# Patient Record
Sex: FEMALE | Race: WHITE
[De-identification: names, ages, dates, MRNs, and addresses within clinical notes are randomized per-mention and may not be internally consistent; named-entity substitution may affect disease eponyms.]

---

## 2017-03-28 ENCOUNTER — HOSPITAL ENCOUNTER (OUTPATIENT)
Dept: HOSPITAL 47 - RADBDWWP | Age: 72
Discharge: HOME | End: 2017-03-28
Payer: MEDICARE

## 2017-03-28 DIAGNOSIS — M85.851: ICD-10-CM

## 2017-03-28 DIAGNOSIS — M85.852: Primary | ICD-10-CM

## 2017-03-28 DIAGNOSIS — Z78.0: ICD-10-CM

## 2017-03-28 PROCEDURE — 77080 DXA BONE DENSITY AXIAL: CPT

## 2017-03-28 NOTE — BD
EXAMINATION TYPE: MG DEXA axial skeleton.  

 

DATE OF EXAM: 3/28/2017 9:50 AM

 

CLINICAL HISTORY: 

 

Height:  60.25 inches

Weight:  140

 

FRAX RISK QUESTIONS:

Alcohol (3 or more units per day):  no

Family History (Parent hip fracture):  no

Glucocorticoids (More than 3mos):  no

           (Ex: prednisone, prednisolone, methylprednisolone, dexamethasone, and hydrocortisone).    
     

History of Fracture in Adulthood: rib, a couple years ago

Secondary Osteoporosis: 

  1.  Type 1 Diabetes: no

  2.  Hyperthyroidism: no

  3.  Menopause before 45: no

  4.  Malnutrition: no

  5.  Chronic liver disease: no

Rheumatoid Arthritis: no

Current Tobacco Use: no

 

RISK FACTORS 

HISTORY OF: 

Family History of Osteoporosis: no

Drink Alcohol: no

Active: yes

Diet low in dairy products/other sources of calcium:  at least one serving a day

Postmenopausal woman: yes

Take estrogen and/or progesterone medications: not now

How long: probably over 5 years

Lost more than 2 inches in height since high school: no

Frequent falls: no

Poor Health: no

Hyperparathyroidism: no

Adrenal Insufficiency: no

 

MEDICATIONS: 

Prednisone or other steroids: no

Thyroid Medications: no 

Osteoporosis Medications: no

Additional Medications: blood pressure meds, multi vitamin, cholesterol meds 

 

EXAM MEASUREMENTS: 

Bone mineral densitometry was performed using the [employer] System.

Bone mineral density as measured about the Lumbar spine is:  

----- L1-L4(G/cm2): 1.246

T Score Values are as follows:

----- L2: -0.6

----- L3: 1.3

----- L4: 1.5

----- L1-L4: 0.6

Bone mineral density not previously done at this facility; done elsewhere years ago

 

Bone mineral density about the R hip (g/cm2): 0.819

Bone mineral density about the L hip (g/cm2): 0.787

T Score values are as follows:

-----R Neck: -1.6

-----L Neck: -1.8

-----R Intertrochanter: -1.2

-----L Intertrochanter: -1.6

Bone mineral density not previously done at this facility; done elsewhere years ago

 

IMPRESSION:

Normal (Values between +1 and -1 indicate normal bone mass)  Lumbar Spine

 

Osteopenia (T Score between -2.5 and -1 as noted by T score values   Bilateral Hips

There is slightly increased risk of fracture and the patient may be considered 

for treatment. Re-Screen 1-2 years.

 

NOTE:  T-SCORE=SD OF THE YOUNG ADULT MEAN.

## 2018-01-30 ENCOUNTER — HOSPITAL ENCOUNTER (OUTPATIENT)
Dept: HOSPITAL 47 - RADMAMWWP | Age: 73
Discharge: HOME | End: 2018-01-30
Payer: MEDICARE

## 2018-01-30 DIAGNOSIS — Z12.31: Primary | ICD-10-CM

## 2018-01-30 PROCEDURE — 77063 BREAST TOMOSYNTHESIS BI: CPT

## 2018-01-30 PROCEDURE — 77067 SCR MAMMO BI INCL CAD: CPT

## 2018-02-01 NOTE — MM
Reason for exam: screening  (asymptomatic).

Last mammogram was performed 1 year and 1 month ago.



History:

Patient is postmenopausal and has history of other cancer at age 54.

Took estrogen for 20 years.  Took progesterone for 20 years.



Physical Findings:

A clinical breast exam by your physician is recommended on an annual basis and 

results should be correlated with mammographic findings.



MG 3D Screening Mammo W/Cad

Bilateral CC and MLO view(s) were taken.

Prior study comparison: December 29, 2016, mammogram.  December 1, 2015, 

mammogram.

The breast tissue is heterogeneously dense. This may lower the sensitivity of 

mammography.  No significant changes when compared with prior studies.





ASSESSMENT: Negative, BI-RAD 1



RECOMMENDATION:

Routine screening mammogram of both breasts in 1 year.

## 2019-03-29 ENCOUNTER — HOSPITAL ENCOUNTER (OUTPATIENT)
Dept: HOSPITAL 47 - RADMAMWWP | Age: 74
Discharge: HOME | End: 2019-03-29
Attending: FAMILY MEDICINE
Payer: MEDICARE

## 2019-03-29 DIAGNOSIS — Z12.31: Primary | ICD-10-CM

## 2019-03-29 DIAGNOSIS — Z78.0: ICD-10-CM

## 2019-03-29 DIAGNOSIS — M85.851: ICD-10-CM

## 2019-03-29 DIAGNOSIS — M41.86: ICD-10-CM

## 2019-03-29 DIAGNOSIS — M85.852: ICD-10-CM

## 2019-03-29 PROCEDURE — 77063 BREAST TOMOSYNTHESIS BI: CPT

## 2019-03-29 PROCEDURE — 77080 DXA BONE DENSITY AXIAL: CPT

## 2019-03-29 PROCEDURE — 77067 SCR MAMMO BI INCL CAD: CPT

## 2019-04-01 NOTE — MM
Reason for exam: screening  (asymptomatic).

Last mammogram was performed 1 year and 2 months ago.



History:

Patient is postmenopausal and has history of other cancer at age 54.

Took estrogen for 20 years.  Took progesterone for 20 years.



Physical Findings:

A clinical breast exam by your physician is recommended on an annual basis and 

results should be correlated with mammographic findings.



MG 3D Screening Mammo W/Cad

Bilateral CC and MLO view(s) were taken.

Prior study comparison: January 30, 2018, bilateral MG 3d screening mammo w/cad.  

December 29, 2016, mammogram.

The breast tissue is heterogeneously dense. This may lower the sensitivity of 

mammography.  There are benign appearing vascular calcifications bilaterally. 

There is no discrete abnormality.





ASSESSMENT: Negative, BI-RAD 1



RECOMMENDATION:

Routine screening mammogram of both breasts in 1 year.

## 2019-04-02 NOTE — BD
EXAMINATION TYPE: Axial Bone Density

 

DATE OF EXAM: 3/29/2019

 

COMPARISON: 03.28.2017

 

CLINICAL HISTORY: 73 YR OLD FEMALE....ICD-10 CODE:   M89.9   DISORDER OF BONE

 

Height:  59.8

Weight:  139

 

FRAX RISK QUESTIONS:

 

History of Fracture in Adulthood: YES

 

 

RISK FACTORS 

HISTORY OF: 

HX OF RIB FRACTURES >50 YRS OLD

Postmenopausal woman: YES, AT 51 YRS OLD

Take estrogen and/or progesterone medications: BCPs IN PAST FOR 10 YRS

Lost more than 2 inches in height since high school: YES

 

MEDICATIONS: 

Additional Medications: BP MEDS, ZOLOFT, REFLUX MEDS, STATINS FOR CHOLESTEROL, VIT D 

Additional History: HYPERTENSION, DEPRESSION, REFLUX CHOLESTEROL

 

EXAM MEASUREMENTS: 

Bone mineral densitometry was performed using the [employer] System.

Bone mineral density as measured about the Lumbar spine is:  

----- L1-L4(G/cm2): 1.276

T Score Values are as follows:

----- L1:  -0.4

----- L2:  -0.5

----- L3:  1.2

----- L4:  2.3

----- L1-L4: 0.8

Bone mineral density has: Increased 3.4% since study of: 03.28.2017

 

Bone mineral density about the R hip (g/cm2): 0.870

Bone mineral density about the L hip (g/cm2): 0.912

T Score values are as follows:

-----R Neck: -1.8

-----L Neck: -1.8

-----R Total: -1.1

-----L Total: -0.8

Bone mineral density has: Decreased -1.7% since study of: 03.28.2017

 

FRAX%s:   THERE IS A 12.3% CHANCE FOR A MAJOR OSTEOPOROTIC FX AND A 2.8% FOR HIP.....PROBABILITY OF F
X IN 10 YRS TIME

 

IMPRESSION:

Osteopenia (T Score between -2.5 and -1) remains present femoral neck level in both hips. Bone densit
y slightly decreased or diminished from prior. Bone density felt falsely elevated in the low back due
 to reactive sclerosis as underlying scoliosis is redemonstrated.

 

There remains slightly increased risk of fracture and the patient may be considered 

for treatment. 

 

Re-Screen 2-5 years.

 

NOTE:  T-SCORE=SD OF THE YOUNG ADULT MEAN.

## 2021-04-05 ENCOUNTER — HOSPITAL ENCOUNTER (OUTPATIENT)
Dept: HOSPITAL 47 - RADBDWWP | Age: 76
Discharge: HOME | End: 2021-04-05
Attending: FAMILY MEDICINE
Payer: MEDICARE

## 2021-04-05 DIAGNOSIS — M85.80: Primary | ICD-10-CM

## 2021-04-05 DIAGNOSIS — M81.8: ICD-10-CM

## 2021-04-05 PROCEDURE — 77080 DXA BONE DENSITY AXIAL: CPT

## 2021-04-05 NOTE — BD
EXAMINATION TYPE: Axial Bone Density

 

DATE OF EXAM: 4/5/2021

 

COMPARISON: 3/29/2019

 

CLINICAL HISTORY: 75-year-old female postmenopausal screening

 

Height:  4 FT 11 1/2 IN

Weight:  135

 

FRAX RISK QUESTIONS:

Alcohol (3 or more units per day):  NO

Family History (Parent hip fracture):  NO

Glucocorticoids (More than 3mos):  NO

           (Ex: prednisone, prednisolone, methylprednisolone, dexamethasone, and hydrocortisone).    
     

History of Fracture in Adulthood: YES

Secondary Osteoporosis:

  1.  Type 1 Diabetes: NO

  2.  Hyperthyroidism: NO

  3.  Menopause before 45: NO

  4.  Malnutrition: NO

  5.  Chronic liver disease: NO

Rheumatoid Arthritis: NO

Current Tobacco Use: NO

 

RISK FACTORS 

HISTORY OF: 

Surgery to Spine/Hip(right/left)/Wrist (right/left): NO

Family History of Osteoporosis: NO

Active: YES

Diet low in dairy products/other sources of calcium:  NO

Postmenopausal woman: AGE 51

Take estrogen and/or progesterone medications: NONE

 

Lost more than 2 inches in height since high school: YES

 

MEDICATIONS: 

Additional Medications: PEVACHOL, ZOLOFT, LOSARTIN 

 

 

Additional History:

 

 

EXAM MEASUREMENTS: 

Bone mineral densitometry was performed using the [employer] System.

Bone mineral density as measured about the Lumbar spine is:  

----- L1-L4(G/cm2): 1.184

T Score Values are as follows:

----- L2: -0.9

----- L3: 0.3

----- L4: 1.5

----- L1-L4: 0.0

Bone mineral density has: DECREASED  -6.2 % since study of: 2019

 

Bone mineral density about the R hip (g/cm2): 0.770

Bone mineral density about the L hip (g/cm2): 0.751

T Score values are as follows:

-----R Neck: -1.9

-----L Neck: -2.1

-----R Total: -1.3

-----L Total: -1.0

Bone mineral density has: DECREASED  -3.5 % since study of: 2019

 

 

IMPRESSION:

Osteopenia (T Score between -2.5 and -1).

 

There is slightly increased risk of fracture and the patient may be considered 

for treatment. 

 

Re-Screen 2-5 years.

 

NOTE:  T-SCORE=SD OF THE YOUNG ADULT MEAN.

## 2022-10-13 ENCOUNTER — HOSPITAL ENCOUNTER (EMERGENCY)
Dept: HOSPITAL 47 - EC | Age: 77
Discharge: HOME | End: 2022-10-13
Payer: MEDICARE

## 2022-10-13 VITALS — DIASTOLIC BLOOD PRESSURE: 76 MMHG | TEMPERATURE: 99.8 F | SYSTOLIC BLOOD PRESSURE: 159 MMHG | RESPIRATION RATE: 18 BRPM

## 2022-10-13 VITALS — HEART RATE: 73 BPM

## 2022-10-13 DIAGNOSIS — Z79.899: ICD-10-CM

## 2022-10-13 DIAGNOSIS — M54.50: Primary | ICD-10-CM

## 2022-10-13 DIAGNOSIS — E78.5: ICD-10-CM

## 2022-10-13 DIAGNOSIS — I10: ICD-10-CM

## 2022-10-13 LAB
PH UR: 7 [PH] (ref 5–8)
SP GR UR: 1.01 (ref 1–1.03)
UROBILINOGEN UR QL STRIP: <2 MG/DL (ref ?–2)

## 2022-10-13 PROCEDURE — 99283 EMERGENCY DEPT VISIT LOW MDM: CPT

## 2022-10-13 PROCEDURE — 81003 URINALYSIS AUTO W/O SCOPE: CPT

## 2022-10-13 NOTE — ED
Back Pain HPI





- General


Chief Complaint: Back Pain/Injury


Stated Complaint: Back Pain


Time Seen by Provider: 10/13/22 15:47


Source: patient, RN notes reviewed


Mode of arrival: ambulatory


Limitations: no limitations





- History of Present Illness


Initial Comments: 


This a 76-year-old female presents emergency Department chief complaint of left-

sided back pain.  Patient states this started last day states it was worse with 

movement states that she was walking around today went down to an episode states

that she woke up very stiff, worsening pain.  She states pain is much better 

when she is sitting upright, not moving.  She has no chest pain or shortness of 

breath.  Denies any lower extremity symptoms denies any bowel, bladder 

incontinence or retention.  Patient states his off to the side.  She has no 

midline back pain.  Patient denies any trauma no falls.  She has no lower 

extremity weakness








- Related Data


                                Home Medications











 Medication  Instructions  Recorded  Confirmed


 


Metoprolol/Hydrochlorothiazide 1 each PO DAILY 05/17/15 05/17/15





[Lopressor Hct 100-25 mg Tab]   


 


Pravastatin Sodium [Pravachol] 20 mg PO HS 05/17/15 05/17/15


 


Sertraline [Zoloft] 200 mg PO DAILY 05/17/15 05/17/15








                                  Previous Rx's











 Medication  Instructions  Recorded


 


Cyclobenzaprine [Flexeril] 5 mg PO TID PRN #15 tablet 10/13/22


 


Ibuprofen [Motrin] 600 mg PO Q8HR PRN #20 tab 10/13/22











                                    Allergies











Allergy/AdvReac Type Severity Reaction Status Date / Time


 


No Known Allergies Allergy   Verified 10/13/22 14:15














Review of Systems


ROS Statement: 


Those systems with pertinent positive or pertinent negative responses have been 

documented in the HPI.





ROS Other: All systems not noted in ROS Statement are negative.





Past Medical History


Past Medical History: Hyperlipidemia, Hypertension


History of Any Multi-Drug Resistant Organisms: None Reported


Past Surgical History: No Surgical Hx Reported


Past Psychological History: No Psychological Hx Reported


Smoking Status: Never smoker


Past Alcohol Use History: None Reported


Past Drug Use History: None Reported





General Exam


Limitations: no limitations


General appearance: alert, in no apparent distress


Head exam: Present: atraumatic, normocephalic, normal inspection


Eye exam: Present: normal appearance, PERRL, EOMI.  Absent: scleral icterus, 

conjunctival injection, periorbital swelling


ENT exam: Present: normal exam, mucous membranes moist


Neck exam: Present: normal inspection.  Absent: tenderness, meningismus, 

lymphadenopathy


Respiratory exam: Present: normal lung sounds bilaterally.  Absent: respiratory 

distress, wheezes, rales, rhonchi, stridor


Cardiovascular Exam: Present: regular rate, normal rhythm, normal heart sounds. 

 Absent: systolic murmur, diastolic murmur, rubs, gallop, clicks


GI/Abdominal exam: Present: soft, normal bowel sounds.  Absent: distended, 

tenderness, guarding, rebound, rigid


Extremities exam: Present: other (Lower extremity strength equal bilaterally 

neurovascular intact equal clinical warmth.)


Back exam: Present: full ROM, tenderness, muscle spasm, paraspinal tenderness.  

Absent: CVA tenderness (R), CVA tenderness (L), vertebral tenderness


Neurological exam: Present: alert, oriented X3, reflexes normal.  Absent: motor 

sensory deficit





Course





                                   Vital Signs











  10/13/22





  14:13


 


Temperature 98.4 F


 


Pulse Rate 73


 


Respiratory 20





Rate 


 


Blood Pressure 131/77


 


O2 Sat by Pulse 96





Oximetry 














Medical Decision Making





- Medical Decision Making





Urinalysis was performed no acute findings.  Patient's symptoms are 

reproducible, worse with movement.  Patient is muscular strain.  Patient has no 

red flag symptoms.  Patient's transplant tackled be discharged stable condition 

to return parameters discussed.





- Lab Data





                                   Lab Results











  10/13/22 Range/Units





  16:19 


 


Urine Color  Yellow  


 


Urine Appearance  Clear  (Clear)  


 


Urine pH  7.0  (5.0-8.0)  


 


Ur Specific Gravity  1.013  (1.001-1.035)  


 


Urine Protein  Negative  (Negative)  


 


Urine Glucose (UA)  Negative  (Negative)  


 


Urine Ketones  Negative  (Negative)  


 


Urine Blood  Negative  (Negative)  


 


Urine Nitrite  Negative  (Negative)  


 


Urine Bilirubin  Negative  (Negative)  


 


Urine Urobilinogen  <2.0  (<2.0)  mg/dL


 


Ur Leukocyte Esterase  Negative  (Negative)  














Disposition


Clinical Impression: 


 Back pain





Disposition: HOME SELF-CARE


Condition: Stable


Instructions (If sedation given, give patient instructions):  Acute Low Back 

Pain (ED)


Additional Instructions: 


Please return to the Emergency Department if symptoms worsen or any other 

concerns.


Prescriptions: 


Cyclobenzaprine [Flexeril] 5 mg PO TID PRN #15 tablet


 PRN Reason: Muscle Spasm


Ibuprofen [Motrin] 600 mg PO Q8HR PRN #20 tab


 PRN Reason: Pain


Is patient prescribed a controlled substance at d/c from ED?: No


Referrals: 


Moises Ross MD [Primary Care Provider] - 1-2 days


Time of Disposition: 16:47

## 2022-12-11 ENCOUNTER — HOSPITAL ENCOUNTER (EMERGENCY)
Dept: HOSPITAL 47 - EC | Age: 77
Discharge: HOME | End: 2022-12-11
Payer: MEDICARE

## 2022-12-11 VITALS — TEMPERATURE: 97.9 F | DIASTOLIC BLOOD PRESSURE: 85 MMHG | SYSTOLIC BLOOD PRESSURE: 171 MMHG | HEART RATE: 84 BPM

## 2022-12-11 VITALS — RESPIRATION RATE: 22 BRPM

## 2022-12-11 DIAGNOSIS — E87.1: ICD-10-CM

## 2022-12-11 DIAGNOSIS — Z79.899: ICD-10-CM

## 2022-12-11 DIAGNOSIS — I10: ICD-10-CM

## 2022-12-11 DIAGNOSIS — E78.5: ICD-10-CM

## 2022-12-11 DIAGNOSIS — U07.1: Primary | ICD-10-CM

## 2022-12-11 LAB
ANION GAP SERPL CALC-SCNC: 12 MMOL/L
BASOPHILS # BLD AUTO: 0.1 K/UL (ref 0–0.2)
BASOPHILS NFR BLD AUTO: 1 %
BUN SERPL-SCNC: 11 MG/DL (ref 7–17)
CALCIUM SPEC-MCNC: 9.2 MG/DL (ref 8.4–10.2)
CHLORIDE SERPL-SCNC: 97 MMOL/L (ref 98–107)
CO2 SERPL-SCNC: 19 MMOL/L (ref 22–30)
EOSINOPHIL # BLD AUTO: 0 K/UL (ref 0–0.7)
EOSINOPHIL NFR BLD AUTO: 0 %
ERYTHROCYTE [DISTWIDTH] IN BLOOD BY AUTOMATED COUNT: 4.82 M/UL (ref 3.8–5.4)
ERYTHROCYTE [DISTWIDTH] IN BLOOD: 12.9 % (ref 11.5–15.5)
GLUCOSE SERPL-MCNC: 146 MG/DL (ref 74–99)
HCT VFR BLD AUTO: 39.5 % (ref 34–46)
HGB BLD-MCNC: 13.8 GM/DL (ref 11.4–16)
LYMPHOCYTES # SPEC AUTO: 1.1 K/UL (ref 1–4.8)
LYMPHOCYTES NFR SPEC AUTO: 22 %
MCH RBC QN AUTO: 28.7 PG (ref 25–35)
MCHC RBC AUTO-ENTMCNC: 35 G/DL (ref 31–37)
MCV RBC AUTO: 81.8 FL (ref 80–100)
MONOCYTES # BLD AUTO: 0.4 K/UL (ref 0–1)
MONOCYTES NFR BLD AUTO: 8 %
NEUTROPHILS # BLD AUTO: 3.4 K/UL (ref 1.3–7.7)
NEUTROPHILS NFR BLD AUTO: 67 %
PLATELET # BLD AUTO: 307 K/UL (ref 150–450)
POTASSIUM SERPL-SCNC: 3.6 MMOL/L (ref 3.5–5.1)
SODIUM SERPL-SCNC: 128 MMOL/L (ref 137–145)
WBC # BLD AUTO: 5.1 K/UL (ref 3.8–10.6)

## 2022-12-11 PROCEDURE — 96360 HYDRATION IV INFUSION INIT: CPT

## 2022-12-11 PROCEDURE — 80048 BASIC METABOLIC PNL TOTAL CA: CPT

## 2022-12-11 PROCEDURE — 87636 SARSCOV2 & INF A&B AMP PRB: CPT

## 2022-12-11 PROCEDURE — 85025 COMPLETE CBC W/AUTO DIFF WBC: CPT

## 2022-12-11 PROCEDURE — 71046 X-RAY EXAM CHEST 2 VIEWS: CPT

## 2022-12-11 PROCEDURE — 36415 COLL VENOUS BLD VENIPUNCTURE: CPT

## 2022-12-11 PROCEDURE — 99283 EMERGENCY DEPT VISIT LOW MDM: CPT

## 2022-12-11 NOTE — ED
General Adult HPI





- General


Chief complaint: Upper Respiratory Infection


Stated complaint: Covid +, chest congestion


Time Seen by Provider: 12/11/22 16:15


Source: patient, family


Mode of arrival: wheelchair


Limitations: no limitations





- History of Present Illness


Initial comments: 


Patient presents to the ED with her  for evaluation.  Patient states that

she tested positive for Covid about a week and a half ago, and she states that 

she still feels "lousy".  Patient states that her symptoms began about 2 weeks 

ago.  Patient states that her symptoms improved briefly, but now have become 

worse again.  Patient states that she is currently on a course of doxycycline, 

which she states she was prescribed for an ear infection.  Patient states that 

she currently feels generally weak, and she states that she has had a mild cough

and chest congestion.  Patient denies having any pain, fever, headache, focal 

neuro deficit, neck pain or stiffness, sore throat, chest pain, dyspnea, 

hemoptysis, palpitations, dizziness, abdominal pain, nausea/vomiting/diarrhea, 

dysuria or urinary symptoms, leg or calf swelling or pain, or any other symptoms

or complaints.








- Related Data


                                Home Medications











 Medication  Instructions  Recorded  Confirmed


 


Metoprolol/Hydrochlorothiazide 1 each PO DAILY 05/17/15 05/17/15





[Lopressor Hct 100-25 mg Tab]   


 


Pravastatin Sodium [Pravachol] 20 mg PO HS 05/17/15 05/17/15


 


Sertraline [Zoloft] 200 mg PO DAILY 05/17/15 05/17/15








                                  Previous Rx's











 Medication  Instructions  Recorded


 


Cyclobenzaprine [Flexeril] 5 mg PO TID PRN #15 tablet 10/13/22


 


Ibuprofen [Motrin] 600 mg PO Q8HR PRN #20 tab 10/13/22











                                    Allergies











Allergy/AdvReac Type Severity Reaction Status Date / Time


 


No Known Allergies Allergy   Verified 12/11/22 13:56














Review of Systems


ROS Statement: 


Those systems with pertinent positive or pertinent negative responses have been 

documented in the HPI.





ROS Other: All systems not noted in ROS Statement are negative.





Past Medical History


Past Medical History: Hyperlipidemia, Hypertension


History of Any Multi-Drug Resistant Organisms: None Reported


Past Surgical History: No Surgical Hx Reported


Past Psychological History: No Psychological Hx Reported


Smoking Status: Never smoker


Past Alcohol Use History: None Reported


Past Drug Use History: None Reported





General Exam


Limitations: no limitations


General appearance: alert, in no apparent distress


Head exam: Present: atraumatic, normocephalic


Eye exam: Present: normal appearance, EOMI


ENT exam: Present: normal oropharynx, mucous membranes moist


Neck exam: Present: other (Trachea is in midline).  Absent: tenderness, 

meningismus


Respiratory exam: Present: normal lung sounds bilaterally.  Absent: respiratory 

distress, wheezes, rales, rhonchi, stridor


Cardiovascular Exam: Present: regular rate, normal rhythm, normal heart sounds, 

other (Normal radial pulses bilaterally)


GI/Abdominal exam: Present: soft.  Absent: distended, tenderness, guarding


Extremities exam: Present: other (Negative Homans sign bilaterally).  Absent: 

tenderness, pedal edema, calf tenderness


Neurological exam: Present: alert, oriented X3.  Absent: motor sensory deficit


Psychiatric exam: Present: normal affect, normal mood


Skin exam: Present: warm, dry, intact, normal color





Course


                                   Vital Signs











  12/11/22 12/11/22





  13:49 16:18


 


Temperature 97.9 F 


 


Pulse Rate 84 


 


Respiratory 20 22





Rate  


 


Blood Pressure 171/85 


 


O2 Sat by Pulse 98 





Oximetry  














- Reevaluation(s)


Reevaluation #1: 





12/11/22 18:40


Patient states that she is now feeling better after IV fluid hydration.  Patient

denies development of any new symptoms while in the ED.  Patient remains alert 

and breathing comfortably with a normal room air oxygen saturation.  Patient and

family are aware the patient's test results, and they all feel comfortable with 

the patient being discharged home at this time.  They were counseled about Covid

infection, as well as hyponatremia.  They were clearly explained return and 

follow-up instructions.  Patient was instructed to restrict her free water 

intake, and she was instructed to follow up closely with her primary care 

provider to have her sodium levels rechecked.  Will discharge patient home with 

her family at this time.





Medical Decision Making





- Medical Decision Making


I suspect that the patient's symptoms are likely due to her Covid infection.  

Patient's chest x-ray is clear.  Patient is breathing comfortably with a normal 

room air oxygen saturation.  Other than mild hyponatremia and a positive Covid 

test, the patient's labs are fairly unremarkable.  Patient was counseled about 

restricting her free water intake, and she was instructed to follow up closely 

with her primary care provider to have her sodium levels rechecked.  Patient was

also counseled about Covid isolation precautions.  Patient and family all feel 

comfortable with the patient being discharged home at this time.








- Lab Data


Result diagrams: 


                                 12/11/22 16:37





                                 12/11/22 16:37


                                   Lab Results











  12/11/22 12/11/22 12/11/22 Range/Units





  16:37 16:37 16:37 


 


WBC  5.1    (3.8-10.6)  k/uL


 


RBC  4.82    (3.80-5.40)  m/uL


 


Hgb  13.8    (11.4-16.0)  gm/dL


 


Hct  39.5    (34.0-46.0)  %


 


MCV  81.8    (80.0-100.0)  fL


 


MCH  28.7    (25.0-35.0)  pg


 


MCHC  35.0    (31.0-37.0)  g/dL


 


RDW  12.9    (11.5-15.5)  %


 


Plt Count  307    (150-450)  k/uL


 


MPV  7.5    


 


Neutrophils %  67    %


 


Lymphocytes %  22    %


 


Monocytes %  8    %


 


Eosinophils %  0    %


 


Basophils %  1    %


 


Neutrophils #  3.4    (1.3-7.7)  k/uL


 


Lymphocytes #  1.1    (1.0-4.8)  k/uL


 


Monocytes #  0.4    (0-1.0)  k/uL


 


Eosinophils #  0.0    (0-0.7)  k/uL


 


Basophils #  0.1    (0-0.2)  k/uL


 


Sodium   128 L   (137-145)  mmol/L


 


Potassium   3.6   (3.5-5.1)  mmol/L


 


Chloride   97 L   ()  mmol/L


 


Carbon Dioxide   19 L   (22-30)  mmol/L


 


Anion Gap   12   mmol/L


 


BUN   11   (7-17)  mg/dL


 


Creatinine   0.46 L   (0.52-1.04)  mg/dL


 


Est GFR (CKD-EPI)AfAm   >90   (>60 ml/min/1.73 sqM)  


 


Est GFR (CKD-EPI)NonAf   >90   (>60 ml/min/1.73 sqM)  


 


Glucose   146 H   (74-99)  mg/dL


 


Calcium   9.2   (8.4-10.2)  mg/dL


 


Influenza Type A (PCR)    Not Detected  (Not Detectd)  


 


Influenza Type B (PCR)    Not Detected  (Not Detectd)  


 


RSV (PCR)    Not Detected  (Not Detectd)  


 


SARS-CoV-2 (PCR)    Detected A  (Not Detectd)  














- Radiology Data





Chest x-ray: No acute cardiopulmonary process.





Disposition


Clinical Impression: 


 COVID-19 virus infection, Hyponatremia





Disposition: HOME SELF-CARE


Condition: Stable


Instructions (If sedation given, give patient instructions):  Hyponatremia (ED),

COVID-19 (Coronavirus Disease 2019) (ED)


Additional Instructions: 


Return to the ER immediately should you develop increased weakness, any 

significant pain, shortness of breath/trouble breathing, persistent vomiting, 

feeling dizzy or faint, or new or worsening symptoms.  Follow up closely with 

your primary care provider.


Is patient prescribed a controlled substance at d/c from ED?: No


Referrals: 


Moises Ross MD [Primary Care Provider] - 1-2 days


Time of Disposition: 18:52

## 2022-12-14 ENCOUNTER — HOSPITAL ENCOUNTER (EMERGENCY)
Dept: HOSPITAL 47 - EC | Age: 77
Discharge: HOME | End: 2022-12-14
Payer: MEDICARE

## 2022-12-14 VITALS — RESPIRATION RATE: 15 BRPM | DIASTOLIC BLOOD PRESSURE: 78 MMHG | HEART RATE: 77 BPM | SYSTOLIC BLOOD PRESSURE: 148 MMHG

## 2022-12-14 VITALS — TEMPERATURE: 98.9 F

## 2022-12-14 DIAGNOSIS — E78.5: ICD-10-CM

## 2022-12-14 DIAGNOSIS — Z79.899: ICD-10-CM

## 2022-12-14 DIAGNOSIS — I10: ICD-10-CM

## 2022-12-14 DIAGNOSIS — U07.1: Primary | ICD-10-CM

## 2022-12-14 LAB
ANION GAP SERPL CALC-SCNC: 10 MMOL/L
BUN SERPL-SCNC: 14 MG/DL (ref 7–17)
CALCIUM SPEC-MCNC: 9.3 MG/DL (ref 8.4–10.2)
CHLORIDE SERPL-SCNC: 100 MMOL/L (ref 98–107)
CO2 SERPL-SCNC: 23 MMOL/L (ref 22–30)
GLUCOSE SERPL-MCNC: 107 MG/DL (ref 74–99)
POTASSIUM SERPL-SCNC: 4 MMOL/L (ref 3.5–5.1)
SODIUM SERPL-SCNC: 133 MMOL/L (ref 137–145)

## 2022-12-14 PROCEDURE — 80048 BASIC METABOLIC PNL TOTAL CA: CPT

## 2022-12-14 PROCEDURE — 71046 X-RAY EXAM CHEST 2 VIEWS: CPT

## 2022-12-14 PROCEDURE — 96374 THER/PROPH/DIAG INJ IV PUSH: CPT

## 2022-12-14 PROCEDURE — 36415 COLL VENOUS BLD VENIPUNCTURE: CPT

## 2022-12-14 PROCEDURE — 99283 EMERGENCY DEPT VISIT LOW MDM: CPT

## 2022-12-14 PROCEDURE — 87636 SARSCOV2 & INF A&B AMP PRB: CPT

## 2022-12-14 NOTE — ED
General Adult HPI





- General


Chief complaint: Upper Respiratory Infection


Stated complaint: chest congestion-revisit


Time Seen by Provider: 12/14/22 10:38


Source: patient, family, RN notes reviewed, old records reviewed


Mode of arrival: ambulatory


Limitations: no limitations





- History of Present Illness


Initial comments: 


Patient is a 77-year-old  female presenting to the emergency room with 

complaints of continued generalized malaise cough and congestion. She tested 

positive at home for COVID on November 30. She presented to the emergency room 3

days ago on 12/11/2022 with complaints similar to today with generalized unwell 

feeling cough and congestion. Workup at that time included CBC, BMP cephid swab 

and chest x-ray.  Chest x-ray was negative for acute process; BMP did reveal a 

low sodium level at 128. She was given IV hydration and discharged home. She 

reports feeling better for approximately 24 hours after receiving IV hydration a

nd began to feel unwell again with generalized malaise and fatigue along with 

congestion and cough. She also states that she developed a headache yesterday. 

She has had headaches in the past and denies any associated symptoms with her 

headache. She denies any blurred or double vision, focal neurological deficits 

or weakness not related to generalized weakness. She received her initial Covid 

vaccine of [employer] in 2020 but has not received any further 

vaccinations for COVID. She has a past medical history significant for 

hyperlipidemia and hypertension.





- Related Data


                                Home Medications











 Medication  Instructions  Recorded  Confirmed


 


Sertraline [Zoloft] 100 mg PO DAILY 05/17/15 12/14/22


 


Biotin 5 mg PO DAILY 12/14/22 12/14/22


 


Cholecalciferol [Vitamin D3 (10 10 mcg PO DAILY 12/14/22 12/14/22





Mcg = 400 Iu)]   


 


Doxycycline [Vibramycin] 100 mg PO Q12H 12/14/22 12/14/22


 


Losartan Potassium [Cozaar] 100 mg PO DAILY 12/14/22 12/14/22


 


Omeprazole [PriLOSEC] 20 mg PO DAILY 12/14/22 12/14/22


 


Pravastatin Sodium [Pravachol] 40 mg PO DAILY 12/14/22 12/14/22


 


Thiamine [Vitamin B-1] 50 mg PO DAILY 12/14/22 12/14/22


 


Ubidecarenone [Co Q-10] 300 mg PO DAILY 12/14/22 12/14/22


 


hydroCHLOROthiazide [Hydrodiuril] 12.5 mg PO DAILY 12/14/22 12/14/22








                                  Previous Rx's











 Medication  Instructions  Recorded


 


dexAMETHasone [Decadron] 4 mg PO DAILY 4 Days #4 tablet 12/14/22











                                    Allergies











Allergy/AdvReac Type Severity Reaction Status Date / Time


 


No Known Allergies Allergy   Verified 12/14/22 13:10














Review of Systems


ROS Statement: 


Those systems with pertinent positive or pertinent negative responses have been 

documented in the HPI.





ROS Other: All systems not noted in ROS Statement are negative.





Past Medical History


Past Medical History: Hyperlipidemia, Hypertension


History of Any Multi-Drug Resistant Organisms: None Reported


Past Surgical History: No Surgical Hx Reported


Past Psychological History: No Psychological Hx Reported


Smoking Status: Never smoker


Past Alcohol Use History: None Reported


Past Drug Use History: None Reported





General Exam


Limitations: no limitations


General appearance: alert, in no apparent distress


Head exam: Present: atraumatic, normocephalic, normal inspection


Eye exam: Present: normal appearance, PERRL, EOMI.  Absent: scleral icterus, 

conjunctival injection, periorbital swelling


ENT exam: Present: normal exam, mucous membranes moist


Neck exam: Present: normal inspection, full ROM


Respiratory exam: Present: normal lung sounds bilaterally.  Absent: respiratory 

distress, wheezes, rales, rhonchi, stridor


Cardiovascular Exam: Present: regular rate, normal rhythm, normal heart sounds. 

Absent: systolic murmur, diastolic murmur, rubs, gallop, clicks


GI/Abdominal exam: Present: soft, normal bowel sounds.  Absent: distended, 

tenderness, guarding, rebound, rigid


Extremities exam: Present: normal inspection.  Absent: pedal edema, joint 

swelling


Back exam: Present: normal inspection


Neurological exam: Present: alert, oriented X3, CN II-XII intact


Psychiatric exam: Present: flat affect


Skin exam: Present: warm, dry, intact, normal color.  Absent: rash





Course


                                   Vital Signs











  12/14/22





  09:18


 


Temperature 98.9 F


 


Pulse Rate 83


 


Respiratory 24





Rate 


 


Blood Pressure 158/83


 


O2 Sat by Pulse 99





Oximetry 














Medical Decision Making





- Medical Decision Making


77-year-old  female presenting to the emergency room with complaints of

generalized malaise cough congestion and headache ongoing since she was 

diagnosed with Covid in late November. She has had a few days of improved 

symptoms. Repeat Cephid swabs and chest x-ray completed patient was in triage. 

Chest x-ray negative by me shows no acute cardiopulmonary process. Separate swab

is positive for COVID, negative for influenza and RSV.





Episode of hyponatremia during visit 3 days ago will repeat BMP and give dose of

IM Decadron. Will monitor response. Repeat BMP showed improved sodium level at 

133 no indication for further diagnostic imaging or laboratory studies. Will 

discharge home in stable condition with a short course of oral Decadron. Return 

parameters to the emergency room discussed at length.





Case discussed with Dr. Ibarra.





- Lab Data


Result diagrams: 


                                 12/14/22 11:59


                                   Lab Results











  12/14/22 12/14/22 Range/Units





  09:26 11:59 


 


Sodium   133 L  (137-145)  mmol/L


 


Potassium   4.0  (3.5-5.1)  mmol/L


 


Chloride   100  ()  mmol/L


 


Carbon Dioxide   23  (22-30)  mmol/L


 


Anion Gap   10  mmol/L


 


BUN   14  (7-17)  mg/dL


 


Creatinine   0.54  (0.52-1.04)  mg/dL


 


Est GFR (CKD-EPI)AfAm   >90  (>60 ml/min/1.73 sqM)  


 


Est GFR (CKD-EPI)NonAf   >90  (>60 ml/min/1.73 sqM)  


 


Glucose   107 H  (74-99)  mg/dL


 


Calcium   9.3  (8.4-10.2)  mg/dL


 


Influenza Type A (PCR)  Not Detected   (Not Detectd)  


 


Influenza Type B (PCR)  Not Detected   (Not Detectd)  


 


RSV (PCR)  Not Detected   (Not Detectd)  


 


SARS-CoV-2 (PCR)  Detected A   (Not Detectd)  














- Radiology Data


Radiology results: report reviewed, image reviewed





Disposition


Clinical Impression: 


 COVID-19 virus infection





Disposition: HOME SELF-CARE


Condition: Stable


Additional Instructions: 


Please quarantine for 5 days after testing positive and restart quarantine if 

symptoms worsen. Take Decadron as prescribed, do not take any NSAIDs including 

ibuprofen with Decadron. Please utilize Tylenol as needed for fevers and pain. 

Taking vitamin C, Zinc, vitamin D 50 mcg, and melatonin may help symptom 

recovery. 


Prescriptions: 


dexAMETHasone [Decadron] 4 mg PO DAILY 4 Days #4 tablet


Is patient prescribed a controlled substance at d/c from ED?: No


Referrals: 


Moises Ross MD [Primary Care Provider] - 1-2 days


Time of Disposition: 12:53

## 2022-12-14 NOTE — ED
URI HPI





- General


Chief Complaint: Upper Respiratory Infection


Stated Complaint: chest congestion-revisit


Time Seen by Provider: 12/14/22 10:38


Source: patient, RN notes reviewed, old records reviewed


Mode of arrival: ambulatory


Limitations: no limitations





- History of Present Illness


Initial Comments: 


Patient is a 77-year-old  female presenting to the emergency room with 

complaints of continued generalized malaise cough and congestion. She tested 

positive at home for COVID on November 30. She presented to the emergency room 3

days ago on 12/11/2022 with complaints similar to today with generalized unwell 

feeling cough and congestion. Workup at that time included CBC, BMP cephid swab 

and chest x-ray.  Chest x-ray was negative for acute process; BMP did reveal a 

low sodium level at 128. She was given IV hydration and discharged home. She has

a past medical history significant for hyperlipidemia and hypertension.





- Related Data


                                Home Medications











 Medication  Instructions  Recorded  Confirmed


 


Metoprolol/Hydrochlorothiazide 1 each PO DAILY 05/17/15 05/17/15





[Lopressor Hct 100-25 mg Tab]   


 


Pravastatin Sodium [Pravachol] 20 mg PO HS 05/17/15 05/17/15


 


Sertraline [Zoloft] 200 mg PO DAILY 05/17/15 05/17/15








                                  Previous Rx's











 Medication  Instructions  Recorded


 


Cyclobenzaprine [Flexeril] 5 mg PO TID PRN #15 tablet 10/13/22


 


Ibuprofen [Motrin] 600 mg PO Q8HR PRN #20 tab 10/13/22











                                    Allergies











Allergy/AdvReac Type Severity Reaction Status Date / Time


 


No Known Allergies Allergy   Verified 12/14/22 09:22














Review of Systems


ROS Statement: 


Those systems with pertinent positive or pertinent negative responses have been 

documented in the HPI.





ROS Other: All systems not noted in ROS Statement are negative.





Past Medical History


Past Medical History: Hyperlipidemia, Hypertension


History of Any Multi-Drug Resistant Organisms: None Reported


Past Surgical History: No Surgical Hx Reported


Past Psychological History: No Psychological Hx Reported


Smoking Status: Never smoker


Past Alcohol Use History: None Reported


Past Drug Use History: None Reported





General Exam


Limitations: no limitations





Course


                                   Vital Signs











  12/14/22





  09:18


 


Temperature 98.9 F


 


Pulse Rate 83


 


Respiratory 24





Rate 


 


Blood Pressure 158/83


 


O2 Sat by Pulse 99





Oximetry 














Medical Decision Making





- Lab Data


                                   Lab Results











  12/14/22 Range/Units





  09:26 


 


Influenza Type A (PCR)  Not Detected  (Not Detectd)  


 


Influenza Type B (PCR)  Not Detected  (Not Detectd)  


 


RSV (PCR)  Not Detected  (Not Detectd)  


 


SARS-CoV-2 (PCR)  Detected A  (Not Detectd)  














Disposition


Referrals: 


Moises Ross MD [Primary Care Provider] - 1-2 days

## 2022-12-14 NOTE — XR
EXAMINATION TYPE: XR chest 2V

 

DATE OF EXAM: 12/14/2022

 

COMPARISON: 12/11/2022

 

TECHNIQUE: PA and lateral views submitted.

 

HISTORY: Cough and chest pain

 

FINDINGS:

The lungs are clear and  there is no pneumothorax, pleural effusion, or focal pneumonia.  Hypertrophi
c and degenerative changes of the spine. Arthropathy of the shoulders. Diffuse osteopenia. Biapical p
leural thickening.

 

IMPRESSION: 

1. No acute process. Correlate for COPD.

## 2023-04-06 ENCOUNTER — HOSPITAL ENCOUNTER (OUTPATIENT)
Dept: HOSPITAL 47 - RADBDWWP | Age: 78
Discharge: HOME | End: 2023-04-06
Attending: FAMILY MEDICINE
Payer: MEDICARE

## 2023-04-06 DIAGNOSIS — I10: ICD-10-CM

## 2023-04-06 DIAGNOSIS — M85.89: Primary | ICD-10-CM

## 2023-04-06 PROCEDURE — 77080 DXA BONE DENSITY AXIAL: CPT

## 2023-04-06 NOTE — BD
EXAMINATION TYPE: Axial Bone Density

 

DATE OF EXAM: 4/6/2023

 

CLINICAL HISTORY: 77 years old Female.  ICD-10 CODE: M89.9 DISORDER OF BONE, UNSPECIFIED

 

 

Height:  59.2

Weight:  138

 

FRAX RISK QUESTIONS:

Glucocorticoids (More than 3mos):  yes, for eyes

           (Ex: prednisone, prednisolone, methylprednisolone, dexamethasone, and hydrocortisone).    
     

History of Fracture in Adulthood: yes

 

RISK FACTORS 

HISTORY OF: 

hx of rib fx as an adult, cough

Postmenopausal woman: yes, at 51

Lost more than 2 inches in height since high school: yes

Frequent falls: a bit unsteady

Hyperparathyroidism: no

Adrenal Insufficiency: no

 

MEDICATIONS: 

Prednisone or other steroids: eye drops only

Additional Medications: Pravachol, zoloft, bp meds, reflux meds, vit d and calcium,        

Additional History: cholesterol, anxiety, reflux, hypertension, glaucoma,   

 

EXAM MEASUREMENTS: 

Bone mineral densitometry was performed using the "[employer] System.

Bone mineral density as measured about the Lumbar spine is:  

----- L1-L4(G/cm2): 10.75

T Score Values are as follows:

----- L1:  -0.8

----- L2:  -1.8

----- L3:  -2.4

----- L4:   1.1

----- L1-L4:     -0.9

 

Z Score Values are as follows:

----- L1:   1.1

----- L2:  0.1

----- L3:  -0.5

----- L4:  3.0

----- L1-L4:   1.0

 

Bone mineral density has: Decreased -9.2% since study of: 04.05.2021

 

Bone mineral density about the R hip (g/cm2):   0.847

Bone mineral density about the L hip (g/cm2):   0.868

T Score values are as follows:

-----R Neck:   -1.8

-----L Neck:   -1.9

-----R Total:   -1.3

-----L Total:   -1.1

 

Z Score values are as follows:

-----R Neck:   0.3

-----L Neck:   0.2

-----R Total:   0.7

-----L Total:   0.9

 

Bone mineral density has: Decreased -0.3% since study of: 04.05.2021

 

FRAX%s: The graph provided illustrates a 31.3% chance for a major osteoporotic fx and a 9.4% chance f
or the hips probability for fx in 10 years time.

 

 

 

 

IMPRESSION:

Osteopenia (T Score between -2.5 and -1).

 

There is slightly increased risk of fracture and the patient may be considered 

for treatment. 

 

Re-Screen 2-5 years.

 

NOTE:  T-SCORE=SD OF THE YOUNG ADULT MEAN.

## 2024-05-06 ENCOUNTER — HOSPITAL ENCOUNTER (OUTPATIENT)
Dept: HOSPITAL 47 - RADUSWWP | Age: 79
Discharge: HOME | End: 2024-05-06
Attending: INTERNAL MEDICINE
Payer: MEDICARE

## 2024-05-06 DIAGNOSIS — N28.89: ICD-10-CM

## 2024-05-06 DIAGNOSIS — R18.8: Primary | ICD-10-CM

## 2024-05-06 DIAGNOSIS — K76.89: ICD-10-CM

## 2024-05-06 PROCEDURE — 76700 US EXAM ABDOM COMPLETE: CPT

## 2024-05-06 NOTE — US
EXAMINATION TYPE: US abdomen complete

 

DATE OF EXAM: 5/6/2024

 

COMPARISON: NONE

 

CLINICAL INDICATION: Female, 78 years old with history of R10.9 ABD PAIN; Right sided abdominal pain 
since January.

 

TECHNIQUE: Multiple sonographic images of the abdomen are obtained.

 

FINDINGS:

 

EXAM MEASUREMENTS:

 

Liver Length:  12.8 cm   

Gallbladder Wall:  0.23 cm   

CBD:  0.46 cm

Spleen:  9.0 cm   

Right Kidney:  11.5 x 3.9 x 4.1 cm 

Left Kidney:  12.5 x 4.6 x 5.6 cm   

 

SONOGRAPHER NOTES: Exam is limited due to gas.

 

Pancreas:  Not well seen.

Liver:  Complex areas seen within the liver. Larger area within the left lobe measures: 1.6 x 2.3 x 1
.6 cm.

Anechoic area seen right lobe: 1.8 x 1.7 x 1.3 cm.

 

Gallbladder:  wnl

**Evidence for sonographic Mann's sign:  No

CBD:  wnl 

Spleen: Appears wnl   

Right Kidney:  *Hydronephrosis was seen.

Anechoic area seen lower pole: 0.9 x 1.0 x 0.9 cm.

*Hyperechoic focus with posterior shadowing seen mid: 0.8 x 0.7 x 0.8 cm.

Left Kidney: Measures upper limits versus slightly enlarged.

Upper IVC:  Appears wnl  

Abd Aorta:  Portions seen appear wnl. Iliacs were obscured.

 

*Complex area seen within the LUQ between the left kidney and spleen measuring: 8.9 x 11.1 x 11.1 cm.
   

 

 

 

IMPRESSION: 

1.  Hypoechoic areas within liver measuring up to 2.3 cm. Further evaluation with MRI liver mass prot
ocol recommended.

2.  Mild dilation of the right renal sinus correlate for obstructive uropathy.

3.  Complex left upper abdominal fluid collection with septations which is indeterminate. Attention o
n MRI of the abdomen for further evaluation. Findings could represent pancreatic cystic neoplasm vers
us sequela of pancreatitis versus other etiologies.

## 2024-05-13 ENCOUNTER — HOSPITAL ENCOUNTER (OUTPATIENT)
Dept: HOSPITAL 47 - RADCTMAIN | Age: 79
Discharge: HOME | End: 2024-05-13
Attending: INTERNAL MEDICINE
Payer: MEDICARE

## 2024-05-13 DIAGNOSIS — K31.89: Primary | ICD-10-CM

## 2024-05-13 LAB — BUN SERPL-SCNC: 16 MG/DL (ref 7–17)

## 2024-05-13 PROCEDURE — 82565 ASSAY OF CREATININE: CPT

## 2024-05-13 PROCEDURE — 84520 ASSAY OF UREA NITROGEN: CPT

## 2024-05-13 PROCEDURE — 74178 CT ABD&PLV WO CNTR FLWD CNTR: CPT

## 2024-05-13 PROCEDURE — 36415 COLL VENOUS BLD VENIPUNCTURE: CPT

## 2024-05-13 NOTE — CT
EXAMINATION TYPE: CT abdomen pelvis wo/w con

 

DATE OF EXAM: 5/13/2024

 

COMPARISON: Ultrasound 5/6/2024

 

HISTORY: abdominal pain, abnormal US

 

CT DLP: 1624 mGycm

 

CONTRAST: 

CT scan of the abdomen and pelvis is performed with Oral Contrast and without and with IV Contrast, p
atient injected with 100 mL of Isovue 300.

 

FINDINGS: 

LUNG BASES-: No visible nodule.  No infiltrate. Small moderate fixed hiatal hernia.

 

LIVER/GB:   No calcified gallstones. Multiple hypoechoic lesions scattered throughout the liver measu
ring up to 1.6 cm likely reflect multiple cysts however can be further evaluated under MRI. Biliary t
ree is of normal caliber. 

 

PANCREAS:  No inflammation.  

 

SPLEEN:  No splenic enlargement.  No lesion seen. 

 

ADRENALS:  No nodule.  No thickening. 

 

Left upper quadrant: Complex cystic mass identified with internal septations and areas of focal calci
fication measuring 10.2 x 10.6 x 7.7 cm. Origin of this mass is indeterminate and could reflect cysti
c pancreatic tail mass. The mass appears to be displacing the left kidney and likely not arising from
 the left kidney however site of origin is indeterminate and MRI is advised.

 

KIDNEYS/BLADDER: Mild right-sided hydronephrosis with prominence of the right renal pelvis could refl
ect congenital or acquired right UPJ obstruction. There is a 5.2 mm calculus lower pole right kidney.
 No distinct renal mass.  Urinary bladder grossly unremarkable. 

 

BOWEL: Normal appendix.  Normal bowel caliber.  No inflammation.

 

GENITAL ORGANS:  No gross abnormality. 

 

LYMPH NODES:  No greater than 1cm abdominal or pelvic lymph nodes are appreciated.

 

AORTA: No significant abnormality. 

 

OSSEOUS STRUCTURES:  No significant abnormality is seen. 

 

OTHER:  No significant additional abnormality is seen. 

 

IMPRESSION: 

1. Left upper quadrant Complex cystic mass identified with internal septations and areas of focal rohit
cification measuring 10.2 x 10.6 x 7.7 cm. Origin of this mass is indeterminate and could reflect cys
tic pancreatic tail mass. The mass appears to be displacing the left kidney and likely not arising fr
om the left kidney however site of origin is indeterminate and MRI is advised.

2. Congenital or acquired right-sided UPJ obstruction.

## 2024-05-24 NOTE — XR
EXAMINATION TYPE: XR chest 2V

 

DATE OF EXAM: 12/11/2022

 

COMPARISON: 5/17/2015

 

HISTORY: Chest congestion

 

TECHNIQUE:  Frontal and lateral views of the chest are obtained.

 

FINDINGS:  There is no focal air space opacity, pleural effusion, or pneumothorax seen.  The cardiac 
silhouette size is within normal limits.   The osseous structures are intact.

 

IMPRESSION:  No acute cardiopulmonary process. No

## 2024-06-01 ENCOUNTER — HOSPITAL ENCOUNTER (OUTPATIENT)
Dept: HOSPITAL 47 - RADMRIMAIN | Age: 79
Discharge: HOME | End: 2024-06-01
Attending: INTERNAL MEDICINE
Payer: MEDICARE

## 2024-06-01 DIAGNOSIS — K76.89: Primary | ICD-10-CM

## 2024-06-01 DIAGNOSIS — N28.1: ICD-10-CM

## 2024-06-01 PROCEDURE — 74181 MRI ABDOMEN W/O CONTRAST: CPT

## 2024-06-05 NOTE — MR
EXAMINATION TYPE: MR abdomen wo con

 

DATE OF EXAM: 6/1/2024 1:09 PM

 

CLINICAL INDICATION:Female, 78 years old with history of R10.9 ABD PAIN; PHH, Pain on Right side with
 loose bowel issues x5 months, Abnormal CT done 5-

 

COMPARISON: CT scan abdomen from lumbar spine 11/3/2017, CT 5/13/2024.

 

TECHNIQUE:  Multiplanar multi-sequence imaging was performed without contrast.  

IV Contrast: cc none

 

FINDINGS: 

LOWER CHEST: No gross irregularity.

 

ABDOMEN

Liver: No evidence for hepatic steatosis or cirrhosis. Scattered high T2 signal cysts are seen throug
hout the liver.

Gallbladder and Bile ducts: No evidence for ductal dilation, or biliary stricture or evidence of chol
edocholithiasis. The gallbladder is within normal limits.

Pancreas: No ductal dilation. No evidence for solid mass.

Spleen: Normal for size. 

Adrenal glands: Unremarkable.

Kidneys: No evidence for hydronephrosis. Peripelvic renal cysts bilaterally.

Stomach and Bowel: No evidence for bowel wall thickening or evidence for obstruction.

Retroperitoneum/Peritoneum:  No evidence of pneumoperitoneum or free fluid.

Vasculature: No aortic aneurysm.

Musculoskeletal: The osseous structures appear intact. Scoliosis changes with degeneration. A T2 sign
al lesions are seen in the spine possibly representing vertebral body hemangiomas given CT correlatio
n.

Lymph Nodes: No gross evidence for lymphadenopathy.

Abdominal wall: Unremarkable.

 

IMPRESSION:

1.  Indeterminate complex left upper quadrant mass felt to be displacing the kidney and not arising f
rom. It is felt to be arising from the adrenal gland when corresponding CT is reviewed. This is seen 
on prior 11/3/2017 MRI. Cystic adrenal neoplasm should be considered such as pheochromocytoma versus 
adrenal pseudocyst versus adrenal endothelial cyst. Correlate with serum adrenal markers. Given somew
hat stable appearance from 2017 finding suggests indolent process.

2.  Scattered simple appearing hepatic cysts.

3.  Right bilateral peripelvic renal cysts.

4.  No evidence for acute abdominal process.

## 2024-06-11 ENCOUNTER — HOSPITAL ENCOUNTER (OUTPATIENT)
Dept: HOSPITAL 47 - RADXRMAIN | Age: 79
Discharge: HOME | End: 2024-06-11
Attending: INTERNAL MEDICINE
Payer: MEDICARE

## 2024-06-11 DIAGNOSIS — M16.0: Primary | ICD-10-CM

## 2024-06-11 PROCEDURE — 73521 X-RAY EXAM HIPS BI 2 VIEWS: CPT

## 2024-06-11 NOTE — XR
EXAMINATION TYPE: XR Hip Bilateral Complete

 

DATE OF EXAM: 6/11/2024

 

COMPARISON: NONE

 

HISTORY: Pain

 

TECHNIQUE: 2 views submitted

 

FINDINGS:

 

 

Right hip:

There is a mild to moderate hypertrophic hip arthropathy correlate for femoral acetabular impingement
. Enthesophytes involving the iliac crest. SI joints are patent. Calcifications in the pelvis likely 
vascular. No erosive change. 

 

Left hip:

There is a mild to moderate hypertrophic hip arthropathy correlate for femoral acetabular impingement
. Enthesophytes involving the iliac crest. SI joints are patent. Calcifications in the pelvis likely 
vascular. No erosive change. 

 

IMPRESSION:

1. 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

## 2024-08-08 ENCOUNTER — HOSPITAL ENCOUNTER (OUTPATIENT)
Dept: HOSPITAL 47 - OR | Age: 79
Discharge: HOME | End: 2024-08-08
Attending: UROLOGY
Payer: MEDICARE

## 2024-08-08 PROCEDURE — 74420 UROGRAPHY RTRGR +-KUB: CPT

## 2024-08-08 PROCEDURE — 74018 RADEX ABDOMEN 1 VIEW: CPT

## 2024-09-16 NOTE — XR
Patient: Cynthia Connors A Ordering Physician: Unknown, Unknown ID: [id number] Phone, Pager: Phone: N/A
 Pager: N/A : [phone] Age/Gender: 78Y, F Primary Location: N/A Procedure: XR KUB Study Date:  6:09:00 AM Accession #: [id number] 

 

EXAMINATION TYPE: XR KUB

 

DATE OF EXAM: 2024 10:59 AM

 

CLINICAL INDICATION: Right-sided kidney stone

 

COMPARISON: None.

 

TECHNIQUE: One radiographic view of the abdomen was obtained.

 

FINDINGS: The bowel gas pattern is nonspecific without dilated loops of small or large bowel. . Fecal
 material and gas are demonstrated throughout the colon and rectum. 

There is no evidence for organomegaly or pneumoperitoneum.  The osseous structures are intact.  Right
 renal calculi measuring up to 4 mm mm. Severe degeneration changes throughout the spine. Pelvic phle
boliths present.

 

IMPRESSION:

1.  Density projecting of the right kidney measuring 4 mm possibly renal stone.

2.  Nonspecific bowel gas pattern without radiographic evidence for acute process.

## 2024-10-01 NOTE — FL
EXAMINATION TYPE: FL urography retrograde

 

COMPARISON: Pre Operative Images if available both CT/MRI or plain film 

 

CLINICAL INDICATION: Female, 78 years old with history of RIGHT KIDNEY STONE CYSTO LITHO STENT PLACE 
IN OR;

 

TECHNIQUE: FL urography retrograde, multiple fluoroscopic images provided for procedure.

 

Total fluoroscopy time: 1.13 minutes

Total submitted images to PACS: 3 

DAP: 6.501 mGym2 Gycm2 uGym2 cGycm2 

 

FINDINGS:

Fluoroscopic images during retrograde pyelogram demonstrate contrast in the renal collecting system. 
There is dilation of the collecting system. Ureteral stent ultimately placed in appropriate position.
 No evidence of extravasation of contrast. No immediate complication identified.

 

 

 

IMPRESSION:

1.  No evidence for intraoperative complication.

2.  Please see the operative/procedural note for further details.

 

[facility], Workstation: [id number], 10/1/2024 6:18 PM

## 2024-11-14 ENCOUNTER — HOSPITAL ENCOUNTER (OUTPATIENT)
Dept: HOSPITAL 47 - RADUSWWP | Age: 79
Discharge: HOME | End: 2024-11-14
Attending: UROLOGY
Payer: MEDICARE

## 2024-11-14 DIAGNOSIS — N28.1: ICD-10-CM

## 2024-11-14 DIAGNOSIS — N13.30: Primary | ICD-10-CM

## 2024-11-14 PROCEDURE — 76770 US EXAM ABDO BACK WALL COMP: CPT

## 2025-07-17 ENCOUNTER — HOSPITAL ENCOUNTER (OUTPATIENT)
Dept: HOSPITAL 47 - RADUSWWP | Age: 80
Discharge: HOME | End: 2025-07-17
Attending: PHYSICIAN ASSISTANT
Payer: MEDICARE

## 2025-07-17 DIAGNOSIS — R60.9: Primary | ICD-10-CM
